# Patient Record
Sex: FEMALE | Race: WHITE
[De-identification: names, ages, dates, MRNs, and addresses within clinical notes are randomized per-mention and may not be internally consistent; named-entity substitution may affect disease eponyms.]

---

## 2017-02-09 NOTE — ED.PDOC
General


ED Provider: 


Dr. GISSEL RICO





Chief Complaint: Non-specific Complaint


Stated Complaint: head lice


Time Seen by Physician: 10:00


Mode of Arrival: Walk-In


Information Source: Patient


Exam Limitations: No limitations


Nursing and Triage Documentation Reviewed and Agree: Yes





Skin Complaint Exam





- Skin Rash/Itching Complaint/Exam


Onset/Duration: head lice


Symptoms Are: Still present


Initial Severity: Mild


Current Severity: Mild


Potential Exposures: Reports: Other (head lice)


Aggravating: Reports: None


Alleviating: Reports: None


Associated Signs and Symptoms: Denies: Difficulty breathing, Fever, Chills


Related History: Similar episode


Skin Findings: Present: Normal findings





Review of Systems





- Review Of Systems


Constitutional: Reports: No symptoms


Eyes: Reports: No symptoms


Ears, Nose, Mouth, Throat: Reports: No symptoms


Respiratory: Reports: No symptoms


Cardiac: Reports: No symptoms


GI: Reports: No symptoms


: Reports: No symptoms


Musculoskeletal: Reports: No symptoms


Skin: Reports: Other (head lice )


Neurological: Reports: No symptoms


Endocrine: Reports: No symptoms


Hematologic/Lymphatic: Reports: No symptoms


All Other Systems: Reviewed and Negative





Past Medical History





- Past Medical History


Endocrine: Reports: None


Cardiovascular: Reports: None


Respiratory: Reports: None


Hematological: Reports: None


Gastrointestinal: Reports: None


Genitourinary: Reports: None


Neuro/Psych: Reports: None


Musculoskeletal: Reports: None


Cancer: Reports: None


Last Menstrual Period: 34 weeks ago





- Surgical History


General Surgical History: Reports: None





- Family History


Family History: Reports: None





- Social History


Smoking Status: Former smoker


Hx Substance Use: No


Alcohol Screening: None





Physical Exam





- Physical Exam


Appearance: Well-appearing, No pain distress, Well-nourished


Eyes: YU, EOMI, Conjunctiva clear


ENT: Ears normal, Nose normal, Oropharynx normal


Respiratory: Airway patent, Breath sounds clear, Breath sounds equal, 

Respirations nonlabored


Cardiovascular: RRR, Pulses normal, No rub, No murmur


GI/: Soft, Nontender, No masses, Bowel sounds normal, No Organomegaly


Musculoskeletal: Normal strength, ROM intact, No edema, No calf tenderness


Skin: Warm, Dry, Normal color (head lice present)


Neurological: Sensation intact, Motor intact, Reflexes intact, Cranial nerves 

intact, Alert, Oriented


Psychiatric: Affect appropriate, Mood appropriate





Critical Care Note





- Critical Care Note


Total Time (mins): 0





Course





- Course


Vital Signs: 





 











  Temp Pulse Resp BP Pulse Ox


 


 02/09/17 09:43  98.0 F  107 H  14  114/79  99














Departure





- Departure


Time of Disposition: 09:59


Disposition: HOME SELF-CARE


Discharge Problem: 


 Head lice infestation





Instructions:  Permethrin (On the skin), Body Lice (ED)


Condition: Good


Pt referred to PMD for follow-up: No


Additional Instructions: 


Please call your Family Physician as soon as possible to schedule a follow-up 

appointment. apply to head only wash after 15 min


Allergies/Adverse Reactions: 


Allergies





No Known Allergies Allergy (Verified 02/09/17 09:48)


 








Home Medications: 


Ambulatory Orders





Prenatal Comb No.42/Folic Acid [Prena1 Chew Tablet] 1.4 mg PO DAILY 12/21/14

## 2018-02-02 ENCOUNTER — HOSPITAL ENCOUNTER (OUTPATIENT)
Dept: HOSPITAL 58 - AMBL | Age: 23
Discharge: ANOTHER HOSPITAL | End: 2018-02-02
Attending: INTERNAL MEDICINE

## 2018-02-02 VITALS — BODY MASS INDEX: 28.3 KG/M2

## 2018-02-02 DIAGNOSIS — N15.9: ICD-10-CM

## 2018-02-02 DIAGNOSIS — N39.0: Primary | ICD-10-CM

## 2018-04-27 ENCOUNTER — HOSPITAL ENCOUNTER (EMERGENCY)
Dept: HOSPITAL 58 - ED | Age: 23
Discharge: HOME | End: 2018-04-27

## 2018-04-27 VITALS — SYSTOLIC BLOOD PRESSURE: 115 MMHG | DIASTOLIC BLOOD PRESSURE: 79 MMHG | TEMPERATURE: 98.2 F

## 2018-04-27 VITALS — BODY MASS INDEX: 24.3 KG/M2

## 2018-04-27 DIAGNOSIS — A64: Primary | ICD-10-CM

## 2018-04-27 DIAGNOSIS — F17.210: ICD-10-CM

## 2018-04-27 PROCEDURE — 81001 URINALYSIS AUTO W/SCOPE: CPT

## 2018-04-27 PROCEDURE — 99283 EMERGENCY DEPT VISIT LOW MDM: CPT

## 2018-04-27 PROCEDURE — 96372 THER/PROPH/DIAG INJ SC/IM: CPT

## 2018-04-27 NOTE — ED.PDOC
General


ED Provider: 


Dr. MICHELLE DOMINGUEZ





Chief Complaint: Non-specific Complaint


Stated Complaint: Patient came for the bad case of head lice,.  alos she is 

been itching and burning in vagina, no discharge, no abdominal pain, using otc 

medication not helping.


Time Seen by Physician: 06:43


Mode of Arrival: Walk-In


Information Source: Patient


Primary Care Provider: 


NELSON WOODS





Nursing and Triage Documentation Reviewed and Agree: Yes


Reviewed sepsis parameters & appropriate labs ordered?: No


System Inflammatory Response Syndrome: Not Applicable


Sepsis Protocol: 


For patient's 13 years and over:





Temp is 96.8 and below  and greater


Pulse >90 BPM


Resp >20/minute


Acutely Altered Mental Status





Are patient's symptoms suggestive of a new infection, such as:


   -Pneumonia


   -Skin, Soft Tissue


   -Endocarditis


   -UTI


   -Bone, Joint Infection


   -Implantable Device


   -Acute Abdominal Infection


   -Wound Infection


   -Meningitis


   -Blood Stream Catheter Infection


   -Unknown








 Complaint Exam





- UTI Female Complaint/Exam


Patient Complains of: Reports: Painful urination (itching and burning on vagina.

)


Symptoms Are: Still present


Timing: Constant


Initial Severity: Mild


Location of Pain: Reports: None


Associated Signs and Symptoms: Denies: Fever, Chills, Flank pain, Dyspareunia, 

Vaginal discharge


Related History: Reports: Similar episode


Related Surgical History: Reports: None


CVA Tenderness: No


Suprapubic Tenderness: No


Vulva Exam: Present: Labial erythema (carito was present)


Differential Diagnoses: Candidiasis, Cystitis, STD





Review of Systems





- Review Of Systems


Constitutional: Reports: No symptoms


Eyes: Reports: No symptoms


Ears, Nose, Mouth, Throat: Reports: No symptoms


Respiratory: Reports: No symptoms


Cardiac: Reports: No symptoms


GI: Reports: No symptoms


: Reports: Burning, Pain


Musculoskeletal: Reports: No symptoms


Skin: Reports: No symptoms


Neurological: Reports: No symptoms


Endocrine: Reports: No symptoms


Hematologic/Lymphatic: Reports: No symptoms


All Other Systems: Reviewed and Negative





Past Medical History





- Past Medical History


Previously Healthy: Yes


Endocrine: Reports: None


Cardiovascular: Reports: None


Respiratory: Reports: None


Hematological: Reports: None


Gastrointestinal: Reports: None


Genitourinary: Reports: None


Neuro/Psych: Reports: None


Musculoskeletal: Reports: None


Cancer: Reports: None


Last Menstrual Period: 5 DAYS AGO





- Surgical History


General Surgical History: Reports: None





- Family History


Family History: Reports: None





- Social History


Smoking Status: Current every day smoker, Light tobacco smoker


Smoking Cessation Counseling Time: > 3 min - 10 min


Hx Substance Use: No


Alcohol Screening: None





- Immunizations


Tetanus Shot up to Date:  (UNKNOWN)





Physical Exam





- Physical Exam


Appearance: Well-appearing, No pain distress, Well-nourished


Eyes: YU, EOMI, Conjunctiva clear


ENT: Ears normal, Nose normal, Oropharynx normal


Respiratory: Airway patent, Breath sounds clear, Breath sounds equal, 

Respirations nonlabored


Cardiovascular: RRR, Pulses normal, No rub, No murmur


GI/: Soft, Nontender, No masses, Bowel sounds normal, No Organomegaly


Musculoskeletal: Normal strength, ROM intact, No edema, No calf tenderness


Skin: Warm, Dry, Normal color


Neurological: Sensation intact, Motor intact, Reflexes intact, Cranial nerves 

intact, Alert, Oriented


Psychiatric: Affect appropriate, Mood appropriate





Critical Care Note





- Critical Care Note


Total Time (mins): 20





Course





- Course


Orders, Labs, Meds: 





Orders











 Category Date Time Status


 


 URINALYSIS C & S IF INDICATED Stat LAB  04/27/18 06:18 Uncollected


 


 Azithromycin [Zithromax] MEDS  04/27/18 06:18 Discontinued





 500 mg PO ONCE STA   


 


 Ceftriaxone Sodium [Rocephin] MEDS  04/27/18 06:18 Discontinued





 250 mg IM ONCE STA   


 


 Lidocaine HCl/Pf [Lidocaine HCl 1% Sdv] MEDS  04/27/18 06:18 Discontinued





 0.9 ml IM ONCE STA   








Medications














Discontinued Medications














Generic Name Dose Route Start Last Admin





  Trade Name Freq  PRN Reason Stop Dose Admin


 


Azithromycin  500 mg  04/27/18 06:18  





  Zithromax  PO  04/27/18 06:19  





  ONCE STA   





     





     





     





     


 


Ceftriaxone Sodium  250 mg  04/27/18 06:18  





  Rocephin  IM  04/27/18 06:19  





  ONCE STA   





     





     





     





     


 


Lidocaine HCl  0.9 ml  04/27/18 06:18  





  Lidocaine Hcl 1% Sdv  IM  04/27/18 06:19  





  ONCE STA   





     





     





     





     











Vital Signs: 





 











  Temp Pulse Resp BP Pulse Ox


 


 04/27/18 05:30  98.2 F  78  18  115/79  98














Departure





- Departure


Time of Disposition: 06:50


Disposition: HOME SELF-CARE


Discharge Problem: 


 STD (female)





Instructions:  Sexually Transmitted Diseases (ED), Safe Sex (ED)


Condition: Stable


Pt referred to PMD for follow-up: Yes


IPMP verified?: No


Additional Instructions: 


Increase Hydration


Do not mix with ETOH


F/u with [facility]





Prescriptions: 


Fluconazole [Diflucan] 200 mg PO BID #8 tablet


Allergies/Adverse Reactions: 


Allergies





No Known Allergies Allergy (Verified 04/27/18 05:47)


 








Home Medications: 


Ambulatory Orders





Fluconazole [Diflucan] 200 mg PO BID #8 tablet 04/27/18 








Disposition Discussed With: Patient

## 2019-08-24 ENCOUNTER — HOSPITAL ENCOUNTER (OUTPATIENT)
Dept: HOSPITAL 58 - AMBL | Age: 24
Discharge: ANOTHER HOSPITAL | End: 2019-08-24
Attending: FAMILY MEDICINE

## 2019-08-24 VITALS — BODY MASS INDEX: 24.3 KG/M2

## 2019-08-24 DIAGNOSIS — R11.0: ICD-10-CM

## 2019-08-24 DIAGNOSIS — R42: ICD-10-CM

## 2019-08-24 DIAGNOSIS — R10.31: Primary | ICD-10-CM
